# Patient Record
Sex: MALE | Race: WHITE | HISPANIC OR LATINO
[De-identification: names, ages, dates, MRNs, and addresses within clinical notes are randomized per-mention and may not be internally consistent; named-entity substitution may affect disease eponyms.]

---

## 2020-12-04 ENCOUNTER — RESULT REVIEW (OUTPATIENT)
Age: 80
End: 2020-12-04

## 2020-12-29 PROBLEM — Z00.00 ENCOUNTER FOR PREVENTIVE HEALTH EXAMINATION: Status: ACTIVE | Noted: 2020-12-29

## 2021-01-07 ENCOUNTER — APPOINTMENT (OUTPATIENT)
Dept: SURGICAL ONCOLOGY | Facility: CLINIC | Age: 81
End: 2021-01-07
Payer: MEDICARE

## 2021-01-07 VITALS
HEIGHT: 61 IN | BODY MASS INDEX: 21.9 KG/M2 | DIASTOLIC BLOOD PRESSURE: 51 MMHG | TEMPERATURE: 97.7 F | OXYGEN SATURATION: 97 % | SYSTOLIC BLOOD PRESSURE: 151 MMHG | WEIGHT: 116 LBS | HEART RATE: 95 BPM

## 2021-01-07 DIAGNOSIS — Z87.898 PERSONAL HISTORY OF OTHER SPECIFIED CONDITIONS: ICD-10-CM

## 2021-01-07 DIAGNOSIS — Z86.39 PERSONAL HISTORY OF OTHER ENDOCRINE, NUTRITIONAL AND METABOLIC DISEASE: ICD-10-CM

## 2021-01-07 DIAGNOSIS — Z87.19 PERSONAL HISTORY OF OTHER DISEASES OF THE DIGESTIVE SYSTEM: ICD-10-CM

## 2021-01-07 DIAGNOSIS — Z86.79 PERSONAL HISTORY OF OTHER DISEASES OF THE CIRCULATORY SYSTEM: ICD-10-CM

## 2021-01-07 DIAGNOSIS — R59.9 ENLARGED LYMPH NODES, UNSPECIFIED: ICD-10-CM

## 2021-01-07 PROCEDURE — 99072 ADDL SUPL MATRL&STAF TM PHE: CPT

## 2021-01-07 PROCEDURE — 99203 OFFICE O/P NEW LOW 30 MIN: CPT

## 2021-01-07 NOTE — REASON FOR VISIT
[Initial Consultation] : an initial consultation for [Referred By: ___] : Referred By: [unfilled] [Spouse] : spouse [FreeTextEntry2] : abdominal mesenteric mass/adenopathy

## 2021-01-07 NOTE — CONSULT LETTER
[Dear  ___] : Dear  [unfilled], [Consult Letter:] : I had the pleasure of evaluating your patient, [unfilled]. [Please see my note below.] : Please see my note below. [Consult Closing:] : Thank you very much for allowing me to participate in the care of this patient.  If you have any questions, please do not hesitate to contact me. [Sincerely,] : Sincerely, [FreeTextEntry2] : Dr. Alyssa Cannon [FreeTextEntry3] : Zack Molina\par (M) [phone]\par (O) [phone] [DrSiena  ___] : Dr. ARMENDARIZ

## 2021-01-07 NOTE — PHYSICAL EXAM
[Normal] : supple, no neck mass and thyroid not enlarged [Normal Neck Lymph Nodes] : normal neck lymph nodes  [Normal Supraclavicular Lymph Nodes] : normal supraclavicular lymph nodes [Normal Groin Lymph Nodes] : normal groin lymph nodes [Normal Axillary Lymph Nodes] : normal axillary lymph nodes [Normal] : oriented to person, place and time, with appropriate affect [de-identified] : palpable fullness in epigastrium

## 2021-01-07 NOTE — ASSESSMENT
[FreeTextEntry1] : Mesenteric mass/confluent adenopathy concerning for malignancy, suspect lymphoma.\par \par Plan: To proceed with minimally invasive excision/biopsy to establish diagnosis and direct care.  Imaging reviewed and operative detail discussed with the patient and his spouse.  Risks/benefits explained.  He wishes to proceed soon.

## 2021-01-07 NOTE — HISTORY OF PRESENT ILLNESS
[de-identified] : Mr. Lamb is an 81 y/o male who presents with several months history of increasing fatigue, decreased appetite and weight loss, cough and SOB.\par Symptoms developed while he was in [place] during the onset of COVID-19 pandemic.\par Upon return to [place], he underwent PET/CT evaluation 11/16/2020 which demonstrated an FDG avid mesenteric mass, measuring up to 7.6 cm, with SUV of 6.3.  Also noted were PET avid diffuse mesenteric adenopathy and left para-aortic adenopathy.\par Findings were concerning for undiagnosed lymphoma.\par Previous IR percutaneous biopsy was nondiagnostic.\par He is referred for surgical biopsy to establish diagnosis.\par

## 2021-01-13 ENCOUNTER — OUTPATIENT (OUTPATIENT)
Dept: OUTPATIENT SERVICES | Facility: HOSPITAL | Age: 81
LOS: 1 days | End: 2021-01-13
Payer: MEDICARE

## 2021-01-13 VITALS
HEART RATE: 97 BPM | DIASTOLIC BLOOD PRESSURE: 80 MMHG | SYSTOLIC BLOOD PRESSURE: 130 MMHG | HEIGHT: 60.5 IN | OXYGEN SATURATION: 98 % | WEIGHT: 115.08 LBS | RESPIRATION RATE: 16 BRPM | TEMPERATURE: 98 F

## 2021-01-13 DIAGNOSIS — R19.00 INTRA-ABDOMINAL AND PELVIC SWELLING, MASS AND LUMP, UNSPECIFIED SITE: ICD-10-CM

## 2021-01-13 DIAGNOSIS — Z98.890 OTHER SPECIFIED POSTPROCEDURAL STATES: Chronic | ICD-10-CM

## 2021-01-13 DIAGNOSIS — I10 ESSENTIAL (PRIMARY) HYPERTENSION: ICD-10-CM

## 2021-01-13 DIAGNOSIS — E11.9 TYPE 2 DIABETES MELLITUS WITHOUT COMPLICATIONS: ICD-10-CM

## 2021-01-13 DIAGNOSIS — K63.89 OTHER SPECIFIED DISEASES OF INTESTINE: ICD-10-CM

## 2021-01-13 LAB
A1C WITH ESTIMATED AVERAGE GLUCOSE RESULT: 6.8 % — HIGH (ref 4–5.6)
ALBUMIN SERPL ELPH-MCNC: 3 G/DL — LOW (ref 3.3–5)
ALP SERPL-CCNC: 97 U/L — SIGNIFICANT CHANGE UP (ref 40–120)
ALT FLD-CCNC: 40 U/L — SIGNIFICANT CHANGE UP (ref 4–41)
ANION GAP SERPL CALC-SCNC: 11 MMOL/L — SIGNIFICANT CHANGE UP (ref 7–14)
AST SERPL-CCNC: 30 U/L — SIGNIFICANT CHANGE UP (ref 4–40)
BILIRUB SERPL-MCNC: 0.4 MG/DL — SIGNIFICANT CHANGE UP (ref 0.2–1.2)
BLD GP AB SCN SERPL QL: POSITIVE — SIGNIFICANT CHANGE UP
BUN SERPL-MCNC: 20 MG/DL — SIGNIFICANT CHANGE UP (ref 7–23)
CALCIUM SERPL-MCNC: 9.6 MG/DL — SIGNIFICANT CHANGE UP (ref 8.4–10.5)
CHLORIDE SERPL-SCNC: 98 MMOL/L — SIGNIFICANT CHANGE UP (ref 98–107)
CO2 SERPL-SCNC: 26 MMOL/L — SIGNIFICANT CHANGE UP (ref 22–31)
CREAT SERPL-MCNC: 0.85 MG/DL — SIGNIFICANT CHANGE UP (ref 0.5–1.3)
ESTIMATED AVERAGE GLUCOSE: 148 MG/DL — HIGH (ref 68–114)
GLUCOSE SERPL-MCNC: 234 MG/DL — HIGH (ref 70–99)
HCT VFR BLD CALC: 33.4 % — LOW (ref 39–50)
HGB BLD-MCNC: 9.8 G/DL — LOW (ref 13–17)
MCHC RBC-ENTMCNC: 24 PG — LOW (ref 27–34)
MCHC RBC-ENTMCNC: 29.3 GM/DL — LOW (ref 32–36)
MCV RBC AUTO: 81.9 FL — SIGNIFICANT CHANGE UP (ref 80–100)
NRBC # BLD: 0 /100 WBCS — SIGNIFICANT CHANGE UP
NRBC # FLD: 0 K/UL — SIGNIFICANT CHANGE UP
PLATELET # BLD AUTO: 429 K/UL — HIGH (ref 150–400)
POTASSIUM SERPL-MCNC: 4.8 MMOL/L — SIGNIFICANT CHANGE UP (ref 3.5–5.3)
POTASSIUM SERPL-SCNC: 4.8 MMOL/L — SIGNIFICANT CHANGE UP (ref 3.5–5.3)
PROT SERPL-MCNC: 9.8 G/DL — HIGH (ref 6–8.3)
RBC # BLD: 4.08 M/UL — LOW (ref 4.2–5.8)
RBC # FLD: 17 % — HIGH (ref 10.3–14.5)
RH IG SCN BLD-IMP: POSITIVE — SIGNIFICANT CHANGE UP
SODIUM SERPL-SCNC: 135 MMOL/L — SIGNIFICANT CHANGE UP (ref 135–145)
WBC # BLD: 9.13 K/UL — SIGNIFICANT CHANGE UP (ref 3.8–10.5)
WBC # FLD AUTO: 9.13 K/UL — SIGNIFICANT CHANGE UP (ref 3.8–10.5)

## 2021-01-13 PROCEDURE — 86077 PHYS BLOOD BANK SERV XMATCH: CPT

## 2021-01-13 PROCEDURE — 93010 ELECTROCARDIOGRAM REPORT: CPT

## 2021-01-13 NOTE — H&P PST ADULT - HISTORY OF PRESENT ILLNESS
Pt is a 80 yr old male scheduled for Robotic Excision of Abdominal Mass Poss Open with Dr Molina tentatively 1/20/21 - pt is poor historian and daughter provided some MH - pt c/o of increasing fatigue and SOB while in [place] and returned home 11/20 and PET and CT scan done while hospitalized at [facility] noted mesenteric mass/ poss lymphoma. Pt hx HTN and DM but takes meds inconsistently   Pt denies COVID or recent travel   Pt to have COVID preop test

## 2021-01-13 NOTE — H&P PST ADULT - RS GEN HX ROS MEA POS PC
Pt has mild dyspnea - hospitalized at [facility] for 1 week 10/20 and testing noted lymphoma as per daughter/dyspnea

## 2021-01-13 NOTE — H&P PST ADULT - NSICDXPASTMEDICALHX_GEN_ALL_CORE_FT
PAST MEDICAL HISTORY:  Abdominal mass     DM (diabetes mellitus)     HTN (hypertension)      PAST MEDICAL HISTORY:  Abdominal mass     DM (diabetes mellitus)     HTN (hypertension)     Lymphoma

## 2021-01-13 NOTE — H&P PST ADULT - NSICDXPROBLEM_GEN_ALL_CORE_FT
PROBLEM DIAGNOSES  Problem: Abdominal mass  Assessment and Plan: Pt scheduled for surgery and preop instructions including instructions for taking Famotidine and for Chlorhexidine use in showering on the day of surgery, given verbally and with use of  written materials, and patient confirming understanding of such instructions using  teach back   method.  OR Booking notified of DM and kiara precautions   Pt given cards for Dr Mclain and Dr Castillo for CC - confirmed same with daughter - abnormal EKG in PST and no recent Cardiac   CC requested - Dr Molina emailed with same     Problem: DM (diabetes mellitus)  Assessment and Plan: Pt to take last Metformin 1/19/21 am dose     Problem: HTN (hypertension)  Assessment and Plan: Pt to take  Amlodipine am DOS

## 2021-01-13 NOTE — H&P PST ADULT - NSANTHOSAYNRD_GEN_A_CORE
No. JOHNATHAN screening performed.  STOP BANG Legend: 0-2 = LOW Risk; 3-4 = INTERMEDIATE Risk; 5-8 = HIGH Risk

## 2021-01-13 NOTE — H&P PST ADULT - ATTENDING COMMENTS
Mesenteric mass, suspected lymphoma.  Plan for robotic partial excision to establish diagnosis and direct treatment.  Extent of excision will depend on intra-operative frozen section.  Risks/benefits explained to patient.

## 2021-01-15 ENCOUNTER — OUTPATIENT (OUTPATIENT)
Dept: OUTPATIENT SERVICES | Facility: HOSPITAL | Age: 81
LOS: 1 days | End: 2021-01-15

## 2021-01-15 DIAGNOSIS — Z98.890 OTHER SPECIFIED POSTPROCEDURAL STATES: Chronic | ICD-10-CM

## 2021-01-15 PROBLEM — R19.00 INTRA-ABDOMINAL AND PELVIC SWELLING, MASS AND LUMP, UNSPECIFIED SITE: Chronic | Status: ACTIVE | Noted: 2021-01-13

## 2021-01-15 PROBLEM — C85.90 NON-HODGKIN LYMPHOMA, UNSPECIFIED, UNSPECIFIED SITE: Chronic | Status: ACTIVE | Noted: 2021-01-13

## 2021-01-15 PROBLEM — E11.9 TYPE 2 DIABETES MELLITUS WITHOUT COMPLICATIONS: Chronic | Status: ACTIVE | Noted: 2021-01-13

## 2021-01-15 PROBLEM — I10 ESSENTIAL (PRIMARY) HYPERTENSION: Chronic | Status: ACTIVE | Noted: 2021-01-13

## 2021-01-17 ENCOUNTER — APPOINTMENT (OUTPATIENT)
Dept: DISASTER EMERGENCY | Facility: CLINIC | Age: 81
End: 2021-01-17

## 2021-01-17 DIAGNOSIS — Z01.818 ENCOUNTER FOR OTHER PREPROCEDURAL EXAMINATION: ICD-10-CM

## 2021-01-18 LAB — SARS-COV-2 N GENE NPH QL NAA+PROBE: NOT DETECTED

## 2021-01-19 ENCOUNTER — TRANSCRIPTION ENCOUNTER (OUTPATIENT)
Age: 81
End: 2021-01-19

## 2021-01-20 ENCOUNTER — OUTPATIENT (OUTPATIENT)
Dept: OUTPATIENT SERVICES | Facility: HOSPITAL | Age: 81
LOS: 1 days | End: 2021-01-20
Payer: COMMERCIAL

## 2021-01-20 ENCOUNTER — OUTPATIENT (OUTPATIENT)
Dept: OUTPATIENT SERVICES | Facility: HOSPITAL | Age: 81
LOS: 1 days | Discharge: HOME | End: 2021-01-20
Payer: MEDICARE

## 2021-01-20 ENCOUNTER — APPOINTMENT (OUTPATIENT)
Dept: SURGICAL ONCOLOGY | Facility: HOSPITAL | Age: 81
End: 2021-01-20

## 2021-01-20 ENCOUNTER — RESULT REVIEW (OUTPATIENT)
Age: 81
End: 2021-01-20

## 2021-01-20 VITALS
SYSTOLIC BLOOD PRESSURE: 119 MMHG | HEART RATE: 95 BPM | HEIGHT: 60.5 IN | DIASTOLIC BLOOD PRESSURE: 78 MMHG | OXYGEN SATURATION: 97 % | RESPIRATION RATE: 16 BRPM | TEMPERATURE: 98 F | WEIGHT: 115.08 LBS

## 2021-01-20 VITALS
HEART RATE: 80 BPM | OXYGEN SATURATION: 98 % | RESPIRATION RATE: 14 BRPM | DIASTOLIC BLOOD PRESSURE: 72 MMHG | SYSTOLIC BLOOD PRESSURE: 129 MMHG

## 2021-01-20 DIAGNOSIS — K63.89 OTHER SPECIFIED DISEASES OF INTESTINE: ICD-10-CM

## 2021-01-20 DIAGNOSIS — Z98.890 OTHER SPECIFIED POSTPROCEDURAL STATES: Chronic | ICD-10-CM

## 2021-01-20 LAB — GLUCOSE BLDC GLUCOMTR-MCNC: 159 MG/DL — HIGH (ref 70–99)

## 2021-01-20 PROCEDURE — 44050 REDUCE BOWEL OBSTRUCTION: CPT

## 2021-01-20 PROCEDURE — 88305 TISSUE EXAM BY PATHOLOGIST: CPT | Mod: 26

## 2021-01-20 PROCEDURE — 88237 TISSUE CULTURE BONE MARROW: CPT

## 2021-01-20 PROCEDURE — 88341 IMHCHEM/IMCYTCHM EA ADD ANTB: CPT | Mod: 26,59

## 2021-01-20 PROCEDURE — 88367 INSITU HYBRIDIZATION AUTO: CPT | Mod: 26

## 2021-01-20 PROCEDURE — S2900 ROBOTIC SURGICAL SYSTEM: CPT | Mod: NC

## 2021-01-20 PROCEDURE — 88342 IMHCHEM/IMCYTCHM 1ST ANTB: CPT | Mod: 26,59

## 2021-01-20 PROCEDURE — 47100 WEDGE BIOPSY OF LIVER: CPT

## 2021-01-20 PROCEDURE — 47600 CHOLECYSTECTOMY: CPT

## 2021-01-20 PROCEDURE — 88364 INSITU HYBRIDIZATION (FISH): CPT | Mod: 26

## 2021-01-20 PROCEDURE — 88360 TUMOR IMMUNOHISTOCHEM/MANUAL: CPT | Mod: 26

## 2021-01-20 PROCEDURE — 88189 FLOWCYTOMETRY/READ 16 & >: CPT

## 2021-01-20 PROCEDURE — 88365 INSITU HYBRIDIZATION (FISH): CPT | Mod: 26,59

## 2021-01-20 RX ORDER — HYDROMORPHONE HYDROCHLORIDE 2 MG/ML
0.5 INJECTION INTRAMUSCULAR; INTRAVENOUS; SUBCUTANEOUS
Refills: 0 | Status: DISCONTINUED | OUTPATIENT
Start: 2021-01-20 | End: 2021-01-20

## 2021-01-20 RX ORDER — AMLODIPINE BESYLATE 2.5 MG/1
1 TABLET ORAL
Qty: 0 | Refills: 0 | DISCHARGE

## 2021-01-20 RX ORDER — SODIUM CHLORIDE 9 MG/ML
1000 INJECTION, SOLUTION INTRAVENOUS
Refills: 0 | Status: DISCONTINUED | OUTPATIENT
Start: 2021-01-20 | End: 2021-01-20

## 2021-01-20 RX ORDER — OXYCODONE HYDROCHLORIDE 5 MG/1
1 TABLET ORAL
Qty: 10 | Refills: 0
Start: 2021-01-20 | End: 2021-01-22

## 2021-01-20 RX ORDER — SODIUM CHLORIDE 9 MG/ML
1000 INJECTION, SOLUTION INTRAVENOUS
Refills: 0 | Status: DISCONTINUED | OUTPATIENT
Start: 2021-01-20 | End: 2021-02-03

## 2021-01-20 RX ORDER — FENTANYL CITRATE 50 UG/ML
50 INJECTION INTRAVENOUS
Refills: 0 | Status: DISCONTINUED | OUTPATIENT
Start: 2021-01-20 | End: 2021-01-20

## 2021-01-20 RX ORDER — ONDANSETRON 8 MG/1
4 TABLET, FILM COATED ORAL ONCE
Refills: 0 | Status: DISCONTINUED | OUTPATIENT
Start: 2021-01-20 | End: 2021-02-03

## 2021-01-20 RX ORDER — METFORMIN HYDROCHLORIDE 850 MG/1
1 TABLET ORAL
Qty: 0 | Refills: 0 | DISCHARGE

## 2021-01-20 RX ADMIN — SODIUM CHLORIDE 50 MILLILITER(S): 9 INJECTION, SOLUTION INTRAVENOUS at 10:30

## 2021-01-20 NOTE — ASU DISCHARGE PLAN (ADULT/PEDIATRIC) - CALL YOUR DOCTOR IF YOU HAVE ANY OF THE FOLLOWING:
Bleeding that does not stop/Pain not relieved by Medications/Nausea and vomiting that does not stop/Inability to tolerate liquids or foods

## 2021-01-20 NOTE — BRIEF OPERATIVE NOTE - OPERATION/FINDINGS
laparoscopic, robot-assisted, partial excision of mesenteric mass    Frozen: atypical lymphoid tissue

## 2021-01-20 NOTE — ASU DISCHARGE PLAN (ADULT/PEDIATRIC) - ASU DC SPECIAL INSTRUCTIONSFT
- follow up with Dr. Molina in 2 weeks, please call the office to make an appointment   - take Tylenol or Motrin for mild to moderate pain   - Take Oxycodone for severe pain as needed

## 2021-01-20 NOTE — ASU DISCHARGE PLAN (ADULT/PEDIATRIC) - NURSING INSTRUCTIONS
DO NOT take any Tylenol (Acetaminophen) or narcotics containing Tylenol until after  3:40pm . You received Tylenol during your operation and it can cause damage to your liver if too much is taken within a 24 hour time period.

## 2021-01-21 DIAGNOSIS — C85.90 NON-HODGKIN LYMPHOMA, UNSPECIFIED, UNSPECIFIED SITE: ICD-10-CM

## 2021-01-22 NOTE — CHART NOTE - NSCHARTNOTEFT_GEN_A_CORE
This provider was told by ASU that patient attempted to call for concern of nausea. This provider tried to reach the patient multiple times via all contact numbers in their chart, including the patient's emergency contact, the daughter. A voice message was left with the number for the answering service to call.    Deonte Mcdonald  PGY-1  D- Team Surgery  [phone]

## 2021-01-25 LAB — CHROM ANALY OVERALL INTERP SPEC-IMP: SIGNIFICANT CHANGE UP

## 2021-01-29 DIAGNOSIS — K63.89 OTHER SPECIFIED DISEASES OF INTESTINE: ICD-10-CM

## 2021-01-29 LAB
HEMATOPATHOLOGY REPORT: SIGNIFICANT CHANGE UP
TM INTERPRETATION: SIGNIFICANT CHANGE UP

## 2021-02-04 ENCOUNTER — APPOINTMENT (OUTPATIENT)
Dept: SURGICAL ONCOLOGY | Facility: CLINIC | Age: 81
End: 2021-02-04
Payer: MEDICARE

## 2021-02-04 VITALS
DIASTOLIC BLOOD PRESSURE: 83 MMHG | TEMPERATURE: 97 F | SYSTOLIC BLOOD PRESSURE: 127 MMHG | HEIGHT: 61 IN | BODY MASS INDEX: 21.9 KG/M2 | WEIGHT: 116 LBS | OXYGEN SATURATION: 99 % | HEART RATE: 103 BPM

## 2021-02-04 DIAGNOSIS — C82.33: ICD-10-CM

## 2021-02-04 DIAGNOSIS — K63.89 OTHER SPECIFIED DISEASES OF INTESTINE: ICD-10-CM

## 2021-02-04 PROCEDURE — 99024 POSTOP FOLLOW-UP VISIT: CPT

## 2021-02-04 NOTE — HISTORY OF PRESENT ILLNESS
[de-identified] : Mr. Lamb is an 81 y/o male who presents with several months history of increasing fatigue, decreased appetite and weight loss, cough and SOB.\par Symptoms developed while he was in [place] during the onset of COVID-19 pandemic.\par Upon return to [place], he underwent PET/CT evaluation 11/16/2020 which demonstrated an FDG avid mesenteric mass, measuring up to 7.6 cm, with SUV of 6.3.  Also noted were PET avid diffuse mesenteric adenopathy and left para-aortic adenopathy.\par Findings were concerning for undiagnosed lymphoma.\par Previous IR percutaneous biopsy was nondiagnostic.\par He is referred for surgical biopsy to establish diagnosis.\par \par 02/04/2021: Patient is s/p robotic partial excision of mesenteric mass 01/20/2021.  Complains of gas pain.\par Pathology: follicular lymphoma, predominantly grade 3A, focally grade 3B.\par He is due to start chemotherapy tomorrow under Dr. Cannon.\par

## 2021-04-16 NOTE — H&P PST ADULT - NEGATIVE RESPIRATORY AND THORAX SYMPTOMS
Patient would like a call back. He stated please do not have the Triage Nurse call me.    no wheezing/no cough

## 2024-06-04 ENCOUNTER — APPOINTMENT (OUTPATIENT)
Dept: PULMONOLOGY | Facility: CLINIC | Age: 84
End: 2024-06-04
Payer: MEDICARE

## 2024-06-04 VITALS
DIASTOLIC BLOOD PRESSURE: 68 MMHG | SYSTOLIC BLOOD PRESSURE: 112 MMHG | TEMPERATURE: 97.7 F | HEIGHT: 61 IN | WEIGHT: 135 LBS | BODY MASS INDEX: 25.49 KG/M2 | OXYGEN SATURATION: 98 % | HEART RATE: 80 BPM

## 2024-06-04 DIAGNOSIS — R05.3 CHRONIC COUGH: ICD-10-CM

## 2024-06-04 DIAGNOSIS — Z85.72 PERSONAL HISTORY OF NON-HODGKIN LYMPHOMAS: ICD-10-CM

## 2024-06-04 DIAGNOSIS — Z78.9 OTHER SPECIFIED HEALTH STATUS: ICD-10-CM

## 2024-06-04 DIAGNOSIS — R91.8 OTHER NONSPECIFIC ABNORMAL FINDING OF LUNG FIELD: ICD-10-CM

## 2024-06-04 DIAGNOSIS — B37.0 CANDIDAL STOMATITIS: ICD-10-CM

## 2024-06-04 PROCEDURE — 94729 DIFFUSING CAPACITY: CPT

## 2024-06-04 PROCEDURE — 94727 GAS DIL/WSHOT DETER LNG VOL: CPT

## 2024-06-04 PROCEDURE — ZZZZZ: CPT

## 2024-06-04 PROCEDURE — 94010 BREATHING CAPACITY TEST: CPT

## 2024-06-04 PROCEDURE — 99205 OFFICE O/P NEW HI 60 MIN: CPT | Mod: 25

## 2024-06-04 RX ORDER — FLUTICASONE FUROATE 100 UG/1
100 POWDER RESPIRATORY (INHALATION)
Refills: 0 | Status: ACTIVE | COMMUNITY

## 2024-06-04 RX ORDER — LISINOPRIL 5 MG/1
5 TABLET ORAL
Refills: 0 | Status: ACTIVE | COMMUNITY

## 2024-06-04 RX ORDER — AMLODIPINE BESYLATE 10 MG/1
10 TABLET ORAL
Refills: 0 | Status: DISCONTINUED | COMMUNITY
End: 2024-06-04

## 2024-06-04 RX ORDER — CARVEDILOL 3.12 MG/1
3.12 TABLET, FILM COATED ORAL
Refills: 0 | Status: ACTIVE | COMMUNITY

## 2024-06-04 RX ORDER — ASPIRIN 81 MG
81 TABLET, DELAYED RELEASE (ENTERIC COATED) ORAL
Refills: 0 | Status: ACTIVE | COMMUNITY

## 2024-06-04 RX ORDER — ATORVASTATIN CALCIUM 80 MG/1
80 TABLET, FILM COATED ORAL
Refills: 0 | Status: ACTIVE | COMMUNITY

## 2024-06-04 RX ORDER — CHOLECALCIFEROL (VITAMIN D3) 25 MCG
TABLET ORAL
Refills: 0 | Status: ACTIVE | COMMUNITY

## 2024-06-04 RX ORDER — MONTELUKAST 10 MG/1
10 TABLET, FILM COATED ORAL
Refills: 0 | Status: ACTIVE | COMMUNITY

## 2024-06-04 RX ORDER — PRAVASTATIN SODIUM 20 MG/1
20 TABLET ORAL
Refills: 0 | Status: DISCONTINUED | COMMUNITY
End: 2024-06-04

## 2024-06-04 RX ORDER — METFORMIN HYDROCHLORIDE 500 MG/1
500 TABLET, COATED ORAL
Refills: 0 | Status: DISCONTINUED | COMMUNITY
End: 2024-06-04

## 2024-06-04 RX ORDER — OMEPRAZOLE 20 MG/1
20 CAPSULE, DELAYED RELEASE ORAL
Refills: 0 | Status: DISCONTINUED | COMMUNITY
End: 2024-06-04

## 2024-06-04 RX ORDER — PREDNISONE 5 MG/1
5 TABLET ORAL
Qty: 120 | Refills: 1 | Status: ACTIVE | COMMUNITY
Start: 2024-06-04 | End: 1900-01-01

## 2024-06-04 RX ORDER — RITUXIMAB 10 MG/ML
INJECTION, SOLUTION INTRAVENOUS
Refills: 0 | Status: ACTIVE | COMMUNITY

## 2024-06-04 NOTE — DISCUSSION/SUMMARY
[FreeTextEntry1] : Mr. Lamb has a chronic cough and congestion.  He does not have any significant infectious symptoms.  CAT scan done at [place] [facility] shows groundglass changes in the right upper lobe but no other significant disease.  Rituxan can cause immunocompromise infections i.e. PCP but usually this does not cause of the cough with congestion and sputum.  Also usually more more of a diffuse infiltrate rather localized.  I do not have the x-rays to review.  We will start the patient on prednisone 20 mg daily.  I would like him to come back in 3 weeks to see if this has helped relieve his symptoms.  He will not take Rituxan on his next infusion until he feels better.  The patient has thrush and we will start nystatin swish and swallow. The patient understands and agrees with plan of care. Today's office visit encompassed 57 minutes. I conducted an extensive history,physical exam and reviewed diagnosis and treatment options including diagnostic tests,radiology studies including cat scans and the use of prescription medication.

## 2024-06-04 NOTE — REASON FOR VISIT
[Initial] : an initial visit [Cough] : cough [Shortness of Breath] : shortness of breath [Wheezing] : wheezing [Spouse] : spouse [TextEntry] : Patient is here because after starting his Rituxan infusion about one month ago patient has not been able to sleep, when he talks too much he starts to get a productive cough. Patient also has SOB from his coughing which causes pain in his back and causes him to choke. He also states that sometimes he gets a wheeze. Patient is PHQ +.

## 2024-06-04 NOTE — PHYSICAL EXAM
[No Acute Distress] : no acute distress [Normal Appearance] : normal appearance [No Neck Mass] : no neck mass [Normal Rate/Rhythm] : normal rate/rhythm [Normal S1, S2] : normal s1, s2 [No Murmurs] : no murmurs [No Resp Distress] : no resp distress [Rhonchi] : rhonchi [No Abnormalities] : no abnormalities [Benign] : benign [Normal Gait] : normal gait [No Clubbing] : no clubbing [No Cyanosis] : no cyanosis [No Edema] : no edema [FROM] : FROM [Normal Color/ Pigmentation] : normal color/ pigmentation [No Focal Deficits] : no focal deficits [Oriented x3] : oriented x3 [Normal Affect] : normal affect [TextBox_11] : thrush [TextBox_68] : Minimal scattered rhonchi

## 2024-06-04 NOTE — PROCEDURE
[FreeTextEntry1] : CAT scan of the chest 6/4/2024 groundglass opacity right upper lobe.  Scattered nodules.  Pictures not available for me to review.  Pulmonary function test 6/4/2024 no overt obstructive or restrictive disease.  Mild decreased diffusion capacity consistent with possible interstitial disease.

## 2024-06-04 NOTE — HISTORY OF PRESENT ILLNESS
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

## 2024-06-10 ENCOUNTER — NON-APPOINTMENT (OUTPATIENT)
Age: 84
End: 2024-06-10

## 2024-06-25 ENCOUNTER — APPOINTMENT (OUTPATIENT)
Dept: PULMONOLOGY | Facility: CLINIC | Age: 84
End: 2024-06-25

## 2024-08-05 ENCOUNTER — APPOINTMENT (OUTPATIENT)
Dept: PULMONOLOGY | Facility: CLINIC | Age: 84
End: 2024-08-05

## 2024-08-05 PROCEDURE — 99215 OFFICE O/P EST HI 40 MIN: CPT

## 2024-08-05 NOTE — HISTORY OF PRESENT ILLNESS
[Never] : never [TextBox_4] : 83 male hx lymphoma on rituxin started on prednisone 20 mg qd for cough and sob bec of ?ild today feels poor still sig cough and mucous yellow and green no fever  ++ dyspnea  and gasp for air no fever  no chest pain no tightness no dog cat no bird  irritated throat from coughing sx worsened after rituxin

## 2024-08-05 NOTE — REASON FOR VISIT
[Follow-Up] : a follow-up visit [Cough] : cough [Shortness of Breath] : shortness of breath [Spouse] : spouse [TextEntry] : 2 months. Patient had a CT performed 6/10/2024 [facility]. PET Scan performed 7/18/2024 [facility].  Patient has SOB from his yellow/greenish phlegm producing cough. Patient states he is having a hard time sleeping due to his cough. He states he only gets about 2 to 3 hours of sleep a night.

## 2024-08-05 NOTE — DISCUSSION/SUMMARY
[FreeTextEntry1] : Mr. Perez complains of severe shortness of breath cough and congestion.  His pulmonary function test showed minimal abnormalities as does his PET scan.  There is an evolving right upper lobe infiltrate.  It is possible he has some mild postnasal drip.  The wife stopped oral steroids.  We will try ipratropium nasal spray 2 sprays twice a day.  I discussed a bronchoscopy for diagnosis of the right upper lobe infiltrate the wife and patient agreed to this procedure.  The risks and benefits were explained.  Will arrange to perform procedure within the next 1 to 2 weeks.  I will discuss with Dr. Reeves regarding same.

## 2024-08-05 NOTE — PROCEDURE
[FreeTextEntry1] : PET scan performed 7/18/2024 no uptake of the FDG.  Opacities right upper lung new since PET 04 19 and more consolidated than that of 7/20/2024 with no activity.  Linear activity in the esophagus.

## 2024-08-22 ENCOUNTER — RESULT REVIEW (OUTPATIENT)
Age: 84
End: 2024-08-22

## 2024-08-27 ENCOUNTER — APPOINTMENT (OUTPATIENT)
Dept: PULMONOLOGY | Facility: CLINIC | Age: 84
End: 2024-08-27
Payer: MEDICARE

## 2024-08-27 VITALS
BODY MASS INDEX: 24.35 KG/M2 | TEMPERATURE: 97.2 F | DIASTOLIC BLOOD PRESSURE: 68 MMHG | OXYGEN SATURATION: 96 % | HEART RATE: 92 BPM | WEIGHT: 129 LBS | SYSTOLIC BLOOD PRESSURE: 118 MMHG | HEIGHT: 61 IN

## 2024-08-27 DIAGNOSIS — R05.3 CHRONIC COUGH: ICD-10-CM

## 2024-08-27 DIAGNOSIS — R91.8 OTHER NONSPECIFIC ABNORMAL FINDING OF LUNG FIELD: ICD-10-CM

## 2024-08-27 PROCEDURE — 99214 OFFICE O/P EST MOD 30 MIN: CPT

## 2024-08-27 RX ORDER — CIPROFLOXACIN HYDROCHLORIDE 500 MG/1
500 TABLET, FILM COATED ORAL
Qty: 28 | Refills: 1 | Status: ACTIVE | COMMUNITY
Start: 2024-08-27 | End: 1900-01-01

## 2024-08-27 RX ORDER — ALBUTEROL SULFATE 90 UG/1
108 (90 BASE) INHALANT RESPIRATORY (INHALATION)
Refills: 0 | Status: ACTIVE | COMMUNITY

## 2024-08-27 NOTE — PROCEDURE
[FreeTextEntry1] : EBUS bronchoscopy 8/22/2024 subcarinal lymph node negative for malignant cells lymphocytes present.  Right upper lobe brushings negative for malignant cells acute inflammatory cells.  Lavage atypical cells sparse atypical cells reactive bronchial bronchial cells with acute inflammation and histiocytes.  Pathology normal lung tissue with fibrosis.  CNS positive Klebsiella.  AFB negative.  Fungi negative.

## 2024-08-27 NOTE — REASON FOR VISIT
[Follow-Up] : a follow-up visit [Cough] : cough [Shortness of Breath] : shortness of breath [Spouse] : spouse [TextEntry] : Patient had a bronchoscopy 8/22/24 and is here for the results.  Patient is h=still experiencing SOB with exertion, he has a phlegm cough which every time he coughs, he has difficulty breathing.  Patient has loss one appetite.

## 2024-08-27 NOTE — DISCUSSION/SUMMARY
[FreeTextEntry1] : Mr. Perez has a history of lymphoma in the past.  He presented to the office for cough and shortness of breath and had a faint right upper lobe infiltrate.  Bronchoscopy was performed which showed no lymphoma but cultures demonstrated Klebsiella.  The patient has no significant symptom infection but because no other diagnosis available I favor a course of treatment.  Will start Cipro floxacillin 500 mg p.o. twice daily for 14 days.  I have asked the patient to return in about 3 weeks time and to return with a chest x-ray to see if there is any change in his right upper lobe infiltrate.  I will discuss this with his oncologist Dr. Cannon [facility] The patient understands and agrees with plan of care. Today's office visit encompassed 32 minutes. I conducted an extensive history, physical exam and reviewed diagnosis and treatment options including diagnostic tests,radiology studies including cat scans and the use of prescription medication.

## 2024-08-27 NOTE — HISTORY OF PRESENT ILLNESS
[Never] : never [TextBox_4] : 83 male  with rul infiltrate sp bronch and bx cytology and  pathology fibrosis c/s + klebsiella  no fever no hemoptysis ++phlegm green and yellow

## 2024-09-16 ENCOUNTER — NON-APPOINTMENT (OUTPATIENT)
Age: 84
End: 2024-09-16

## 2024-09-17 ENCOUNTER — APPOINTMENT (OUTPATIENT)
Dept: PULMONOLOGY | Facility: CLINIC | Age: 84
End: 2024-09-17
Payer: MEDICARE

## 2024-09-17 VITALS
TEMPERATURE: 97.3 F | OXYGEN SATURATION: 93 % | DIASTOLIC BLOOD PRESSURE: 80 MMHG | HEART RATE: 93 BPM | HEIGHT: 61 IN | SYSTOLIC BLOOD PRESSURE: 142 MMHG | BODY MASS INDEX: 24.35 KG/M2 | WEIGHT: 129 LBS

## 2024-09-17 DIAGNOSIS — R05.3 CHRONIC COUGH: ICD-10-CM

## 2024-09-17 DIAGNOSIS — R91.8 OTHER NONSPECIFIC ABNORMAL FINDING OF LUNG FIELD: ICD-10-CM

## 2024-09-17 PROCEDURE — 99215 OFFICE O/P EST HI 40 MIN: CPT

## 2024-09-17 RX ORDER — PREDNISONE 5 MG/1
5 TABLET ORAL
Qty: 60 | Refills: 3 | Status: ACTIVE | COMMUNITY
Start: 2024-09-17 | End: 1900-01-01

## 2024-09-17 NOTE — REASON FOR VISIT
[Cough] : cough [Wheezing] : wheezing [Spouse] : spouse [TextEntry] : Patient states he finished the antibiotic and has only seen a slight improvement.  He is still coughing up green/yellowish phlegm.

## 2024-09-17 NOTE — DISCUSSION/SUMMARY
[FreeTextEntry1] : Mr. Lamb has a persistent cough and congestion.  He had a very vague right upper lobe infiltrate which on bronchoscopy was negative.  BAL did grow out Klebsiella he was treated with Cipro.  He does not feel any improvement.  I have asked the patient to obtain a sputum for culture sensitivity AFB and fungi.  We will try prednisone 10 mg a day to see if we can decrease any inflammation that is present.  Lastly we will obtain sinus x-rays to see if any acute sinusitis.  This been discussed with the patient his wife and family member via phone and all are in agreement with this plan of care. The patient understands and agrees with the plan of care. Today's office visit encompassed 42 minutes. I conducted an extensive history, physical exam and reviewed diagnosis and treatment options including diagnostic tests radiology studies including cat scans and the use of prescription medication.

## 2024-09-17 NOTE — HISTORY OF PRESENT ILLNESS
[Never] : never [TextBox_4] : 83 male  sp bronch for rul pneumonia  +klebsiella  on bal treated with cipro 500 bid 14d today no improvement  still cough at nite  feels choking  +tickle in throat

## 2024-09-19 ENCOUNTER — NON-APPOINTMENT (OUTPATIENT)
Age: 84
End: 2024-09-19

## 2024-09-22 NOTE — ASU DISCHARGE PLAN (ADULT/PEDIATRIC) - FOLLOW UP APPOINTMENTS
may also call [facility] (PACU) 24/7 @ [phone]/[facility] ASU (Adult): Patient baseline mental status

## 2024-10-01 ENCOUNTER — APPOINTMENT (OUTPATIENT)
Dept: PULMONOLOGY | Facility: CLINIC | Age: 84
End: 2024-10-01

## 2025-05-14 ENCOUNTER — OFFICE (OUTPATIENT)
Facility: LOCATION | Age: 85
Setting detail: OPHTHALMOLOGY
End: 2025-05-14
Payer: COMMERCIAL

## 2025-05-14 DIAGNOSIS — H16.223: ICD-10-CM

## 2025-05-14 DIAGNOSIS — H31.29: ICD-10-CM

## 2025-05-14 DIAGNOSIS — H11.153: ICD-10-CM

## 2025-05-14 DIAGNOSIS — E11.9: ICD-10-CM

## 2025-05-14 DIAGNOSIS — H25.13: ICD-10-CM

## 2025-05-14 PROBLEM — H35.363 DRUSEN; BOTH EYES: Status: ACTIVE | Noted: 2025-05-14

## 2025-05-14 PROCEDURE — 92004 COMPRE OPH EXAM NEW PT 1/>: CPT | Performed by: OPHTHALMOLOGY

## 2025-05-14 PROCEDURE — 92250 FUNDUS PHOTOGRAPHY W/I&R: CPT | Performed by: OPHTHALMOLOGY

## 2025-05-14 ASSESSMENT — SUPERFICIAL PUNCTATE KERATITIS (SPK)
OD_SPK: T 1+
OS_SPK: T 1+

## 2025-05-14 ASSESSMENT — CONFRONTATIONAL VISUAL FIELD TEST (CVF)
OD_FINDINGS: FULL
OS_FINDINGS: FULL

## 2025-05-14 ASSESSMENT — TONOMETRY
OS_IOP_MMHG: 16
OD_IOP_MMHG: 16

## 2025-05-15 PROBLEM — H31.29 PERIPAPILLARY ATROPHY ; BOTH EYES: Status: ACTIVE | Noted: 2025-05-14

## 2025-05-15 PROBLEM — E11.9 TYPE 2 DIABETES MELLITUS WITHOUT COMPLICATIONS ; RIGHT EYE: Status: ACTIVE | Noted: 2025-05-14

## 2025-05-15 ASSESSMENT — VISUAL ACUITY
OD_BCVA: 20/30-1
OS_BCVA: 20/40-2

## 2025-05-15 ASSESSMENT — REFRACTION_AUTOREFRACTION
OS_SPHERE: +0.25
OD_AXIS: 173
OS_CYLINDER: +2.25
OS_AXIS: 172
OD_SPHERE: -0.75
OD_CYLINDER: +2.50